# Patient Record
Sex: MALE | Race: WHITE
[De-identification: names, ages, dates, MRNs, and addresses within clinical notes are randomized per-mention and may not be internally consistent; named-entity substitution may affect disease eponyms.]

---

## 2017-06-29 ENCOUNTER — HOSPITAL ENCOUNTER (EMERGENCY)
Dept: HOSPITAL 46 - ED | Age: 32
Discharge: HOME | End: 2017-06-29
Payer: COMMERCIAL

## 2017-06-29 VITALS — BODY MASS INDEX: 23.86 KG/M2 | HEIGHT: 73 IN | WEIGHT: 180.01 LBS

## 2017-06-29 DIAGNOSIS — I48.91: Primary | ICD-10-CM

## 2017-06-30 NOTE — EKG
[facility]
                                    [address]
                                  [place]  [address]
_________________________________________________________________________________________
                                                                 Signed   
 
 
Atrial fibrillation
Minimal voltage criteria for LVH, may be normal variant
Abnormal ECG
When compared with ECG of 23-NOV-2016 02:27,
Atrial fibrillation has replaced Sinus rhythm
Confirmed by EDUARDO SANDOVAL MD ([id number]) on 6/30/2017 12:59:26 AM
 
 
 
 
 
 
 
 
 
 
 
 
 
 
 
 
 
 
 
 
 
 
 
 
 
 
 
 
 
 
 
 
 
 
 
 
 
 
 
    Electronically Signed By: EDUARDO SANDOVAL MD  06/30/17 0059
_________________________________________________________________________________________
PATIENT NAME:     CALLY ARANA           
MEDICAL RECORD #: [id number]                     Electrocardiogram             
          ACCT #: [id number]  
DATE OF BIRTH:   [birth year]                                       
PHYSICIAN:   EDUARDO SANDOVAL MD                   REPORT #: [id number]
REPORT IS CONFIDENTIAL AND NOT TO BE RELEASED WITHOUT AUTHORIZATION

## 2018-04-03 ENCOUNTER — HOSPITAL ENCOUNTER (EMERGENCY)
Dept: HOSPITAL 46 - ED | Age: 33
LOS: 1 days | Discharge: HOME | End: 2018-04-04
Payer: COMMERCIAL

## 2018-04-03 VITALS — HEIGHT: 73 IN | BODY MASS INDEX: 23.86 KG/M2 | WEIGHT: 180.01 LBS

## 2018-04-03 DIAGNOSIS — I48.91: ICD-10-CM

## 2018-04-03 DIAGNOSIS — R21: Primary | ICD-10-CM

## 2019-08-01 ENCOUNTER — APPOINTMENT (RX ONLY)
Dept: URBAN - METROPOLITAN AREA CLINIC 34 | Facility: CLINIC | Age: 34
Setting detail: DERMATOLOGY
End: 2019-08-01

## 2019-08-01 VITALS
HEART RATE: 87 BPM | WEIGHT: 200 LBS | SYSTOLIC BLOOD PRESSURE: 126 MMHG | HEIGHT: 73 IN | DIASTOLIC BLOOD PRESSURE: 76 MMHG

## 2019-08-01 DIAGNOSIS — R03.0 ELEVATED BLOOD-PRESSURE READING, WITHOUT DIAGNOSIS OF HYPERTENSION: ICD-10-CM

## 2019-08-01 DIAGNOSIS — B35.3 TINEA PEDIS: ICD-10-CM

## 2019-08-01 DIAGNOSIS — L81.0 POSTINFLAMMATORY HYPERPIGMENTATION: ICD-10-CM

## 2019-08-01 PROBLEM — I48.91 UNSPECIFIED ATRIAL FIBRILLATION: Status: ACTIVE | Noted: 2019-08-01

## 2019-08-01 PROCEDURE — 87220 TISSUE EXAM FOR FUNGI: CPT

## 2019-08-01 PROCEDURE — ? PLAN FOR BMI MANAGEMENT

## 2019-08-01 PROCEDURE — ? KOH PREP

## 2019-08-01 PROCEDURE — 99202 OFFICE O/P NEW SF 15 MIN: CPT

## 2019-08-01 PROCEDURE — ? PRESCRIPTION

## 2019-08-01 PROCEDURE — ? COUNSELING

## 2019-08-01 RX ORDER — TERBINAFINE HYDROCHLORIDE 250 MG/1
1 TABLET ORAL DAILY
Qty: 30 | Refills: 0 | Status: ERX | COMMUNITY
Start: 2019-08-01

## 2019-08-01 RX ORDER — ECONAZOLE NITRATE 10 MG/G
SMALL AMOUNT CREAM TOPICAL BID
Qty: 1 | Refills: 0 | Status: ERX | COMMUNITY
Start: 2019-08-01

## 2019-08-01 RX ADMIN — TERBINAFINE HYDROCHLORIDE 1: 250 TABLET ORAL at 18:47

## 2019-08-01 RX ADMIN — ECONAZOLE NITRATE SMALL AMOUNT: 10 CREAM TOPICAL at 18:47

## 2019-08-01 ASSESSMENT — BSA RASH: BSA RASH: 1

## 2019-08-01 ASSESSMENT — LOCATION SIMPLE DESCRIPTION DERM
LOCATION SIMPLE: RIGHT THIGH
LOCATION SIMPLE: RIGHT FOOT
LOCATION SIMPLE: LEFT PLANTAR SURFACE
LOCATION SIMPLE: LEFT FOOT
LOCATION SIMPLE: RIGHT PLANTAR SURFACE

## 2019-08-01 ASSESSMENT — LOCATION ZONE DERM
LOCATION ZONE: LEG
LOCATION ZONE: FEET

## 2019-08-01 ASSESSMENT — LOCATION DETAILED DESCRIPTION DERM
LOCATION DETAILED: LEFT PLANTAR FOREFOOT OVERLYING 3RD METATARSAL
LOCATION DETAILED: RIGHT INSTEP
LOCATION DETAILED: RIGHT ANTERIOR MEDIAL PROXIMAL THIGH
LOCATION DETAILED: RIGHT PLANTAR FOREFOOT OVERLYING 3RD METATARSAL
LOCATION DETAILED: 1ST WEBSPACE RIGHT FOOT
LOCATION DETAILED: 1ST WEBSPACE LEFT FOOT

## 2019-08-01 ASSESSMENT — SEVERITY ASSESSMENT
SEVERITY: ALMOST CLEAR
SEVERITY: MILD

## 2019-08-01 NOTE — PROCEDURE: COUNSELING
Patient Specific Counseling (Will Not Stick From Patient To Patient): He has no active Tinea Cruris. He has changes from the previous inflammation. This will resolve as long as her is not re-infected. The infection is from his feet. We are treating the Tinea Pedis with oral Lamisil.
Quality 317: Preventative Care And Screening: Screening For High Blood Pressure And Follow-Up Documented: Pre-hypertensive or hypertensive blood pressure reading documented, and the indicated follow-up is documented
Patient Specific Counseling (Will Not Stick From Patient To Patient): He will dose daily for 30 days. Due to the short course Labs will not be needed. When this is completed he will move to weekly application of the feet to keep the re-infection rate lower.\\nHe will use fungal power or spray to the shoes. He should change his shoes and boots out every day so they have time to dry out. \\nHe should were flip flops into the shower and in public areas to prevent reinfection.
Detail Level: Zone
Detail Level: Detailed

## 2019-08-01 NOTE — PROCEDURE: KOH PREP
Koh Intro Text (From The.....): A KOH prep was ordered and evaluated
Showing: branching hyphae
Koh Procedure Text (Tissue Harvesting Technique): A 15-blade scalpel was used to scrape the skin. The skin scrapings were placed on a glass slide, covered with a coverslip and a KOH solution was applied.
[facility] Id #: [id number]
Detail Level: Detailed

## 2023-09-11 ENCOUNTER — HOSPITAL ENCOUNTER (EMERGENCY)
Dept: HOSPITAL 46 - ED | Age: 38
Discharge: HOME | End: 2023-09-11
Payer: COMMERCIAL

## 2023-09-11 VITALS — DIASTOLIC BLOOD PRESSURE: 77 MMHG | SYSTOLIC BLOOD PRESSURE: 126 MMHG

## 2023-09-11 VITALS — BODY MASS INDEX: 27.17 KG/M2 | WEIGHT: 205.03 LBS | HEIGHT: 73 IN

## 2023-09-11 DIAGNOSIS — Z88.2: ICD-10-CM

## 2023-09-11 DIAGNOSIS — I48.91: ICD-10-CM

## 2023-09-11 DIAGNOSIS — R07.9: Primary | ICD-10-CM
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
## 2023-09-11 LAB
ALBUMIN SERPL-MCNC: 4.1 G/DL (ref 3.4–5)
ALBUMIN/GLOB SERPL: 1.21 {RATIO} (ref 1.1–2.4)
ALP SERPL-CCNC: 77 U/L (ref 46–116)
ALT SERPL W P-5'-P-CCNC: 44 U/L (ref 14–59)
ANION GAP SERPL CALCULATED.4IONS-SCNC: 11.9 MMOL/L (ref 7–21)
AST SERPL-CCNC: 29 U/L (ref 15–37)
BASOPHILS NFR BLD AUTO: 0.8 % (ref 0–2)
BUN SERPL-MCNC: 16 MG/DL (ref 7–18)
BUN/CREAT SERPL: 15.09 (ref 6–28.6)
CALCIUM SERPL-MCNC: 8.9 MG/DL (ref 8.5–10.1)
CHLORIDE SERPL-SCNC: 108 MMOL/L (ref 98–107)
CO2 SERPL-SCNC: 28 MMOL/L (ref 21–32)
DEPRECATED RDW RBC AUTO: 13.6 FL (ref 10.5–15)
EGFRCR SERPLBLD CKD-EPI 2021: 92 ML/MIN (ref 60–?)
EOSINOPHIL NFR BLD AUTO: 6.1 % (ref 0–6)
GLOBULIN SER-MCNC: 3.4 G/DL (ref 1.8–3.5)
HCT VFR BLD AUTO: 43.1 % (ref 35–50)
HGB BLD-MCNC: 14.6 G/DL (ref 12–18)
LYMPHOCYTES NFR BLD AUTO: 34.6 % (ref 24–44)
MAGNESIUM SERPL-MCNC: 1.9 MG/DL (ref 1.8–2.4)
MCH RBC QN AUTO: 29.9 PG (ref 27–36)
MCHC RBC AUTO-ENTMCNC: 34 G/DL (ref 30–36)
MCV RBC AUTO: 88 FL (ref 81–99)
MONOCYTES NFR BLD AUTO: 8.2 % (ref 0–12)
NEUTROPHILS NFR BLD AUTO: 50.3 % (ref 39–80)
PLATELET # BLD AUTO: 248 K/UL (ref 140–440)
POTASSIUM SERPL-SCNC: 3.9 MMOL/L (ref 3.5–5.1)
PROT SERPL-MCNC: 7.5 G/DL (ref 6.4–8.2)
RBC # BLD AUTO: 4.9 M/UL (ref 4.3–5.7)

## 2023-09-11 PROCEDURE — A9270 NON-COVERED ITEM OR SERVICE: HCPCS

## 2023-09-12 NOTE — EKG
[facility]
                                    [address]
                                  [place]  [address]
_________________________________________________________________________________________
                                                                 Signed   
 
 
Normal sinus rhythm
T wvae inversion V1
Abnormal ECG
When compared with ECG of 29-JUN-2017 08:36,
Sinus rhythm has replaced Atrial fibrillation
T wave inversion no longer evident in Inferior leads
Confirmed by RANDALL ESTRADA MD ([id number]) on 9/12/2023 5:47:32 AM
 
 
 
 
 
 
 
 
 
 
 
 
 
 
 
 
 
 
 
 
 
 
 
 
 
 
 
 
 
 
 
 
 
 
 
 
 
 
    Electronically Signed By: RANDALL ESTRADA  09/12/23 0547
_________________________________________________________________________________________
PATIENT NAME:     ACLLY ARANA           
MEDICAL RECORD #: [id number]                     Electrocardiogram             
          ACCT #: [id number]  
DATE OF BIRTH:   [birth year]                                       
PHYSICIAN:   RANDALL ESTRADA                       REPORT #: [id number]
REPORT IS CONFIDENTIAL AND NOT TO BE RELEASED WITHOUT AUTHORIZATION